# Patient Record
Sex: MALE | Race: ASIAN | NOT HISPANIC OR LATINO | URBAN - METROPOLITAN AREA
[De-identification: names, ages, dates, MRNs, and addresses within clinical notes are randomized per-mention and may not be internally consistent; named-entity substitution may affect disease eponyms.]

---

## 2024-04-03 ENCOUNTER — EMERGENCY (EMERGENCY)
Facility: HOSPITAL | Age: 49
LOS: 1 days | Discharge: HOME | End: 2024-04-03
Attending: EMERGENCY MEDICINE | Admitting: EMERGENCY MEDICINE
Payer: SELF-PAY

## 2024-04-03 VITALS
DIASTOLIC BLOOD PRESSURE: 87 MMHG | RESPIRATION RATE: 18 BRPM | OXYGEN SATURATION: 97 % | HEART RATE: 80 BPM | TEMPERATURE: 98 F | SYSTOLIC BLOOD PRESSURE: 135 MMHG

## 2024-04-03 LAB
A1C WITH ESTIMATED AVERAGE GLUCOSE RESULT: 11.4 % — HIGH (ref 4–5.6)
ALBUMIN SERPL ELPH-MCNC: 4.2 G/DL — SIGNIFICANT CHANGE UP (ref 3.3–5)
ALP SERPL-CCNC: 124 U/L — HIGH (ref 40–120)
ALT FLD-CCNC: 13 U/L — SIGNIFICANT CHANGE UP (ref 4–41)
ANION GAP SERPL CALC-SCNC: 11 MMOL/L — SIGNIFICANT CHANGE UP (ref 7–14)
AST SERPL-CCNC: 15 U/L — SIGNIFICANT CHANGE UP (ref 4–40)
B-OH-BUTYR SERPL-SCNC: <0 MMOL/L — SIGNIFICANT CHANGE UP (ref 0–0.4)
BASOPHILS # BLD AUTO: 0.02 K/UL — SIGNIFICANT CHANGE UP (ref 0–0.2)
BASOPHILS NFR BLD AUTO: 0.2 % — SIGNIFICANT CHANGE UP (ref 0–2)
BILIRUB SERPL-MCNC: 0.4 MG/DL — SIGNIFICANT CHANGE UP (ref 0.2–1.2)
BLOOD GAS VENOUS COMPREHENSIVE RESULT: SIGNIFICANT CHANGE UP
BUN SERPL-MCNC: 17 MG/DL — SIGNIFICANT CHANGE UP (ref 7–23)
CALCIUM SERPL-MCNC: 9.4 MG/DL — SIGNIFICANT CHANGE UP (ref 8.4–10.5)
CHLORIDE SERPL-SCNC: 100 MMOL/L — SIGNIFICANT CHANGE UP (ref 98–107)
CO2 SERPL-SCNC: 25 MMOL/L — SIGNIFICANT CHANGE UP (ref 22–31)
CREAT SERPL-MCNC: 0.75 MG/DL — SIGNIFICANT CHANGE UP (ref 0.5–1.3)
EGFR: 113 ML/MIN/1.73M2 — SIGNIFICANT CHANGE UP
EOSINOPHIL # BLD AUTO: 0.1 K/UL — SIGNIFICANT CHANGE UP (ref 0–0.5)
EOSINOPHIL NFR BLD AUTO: 1.1 % — SIGNIFICANT CHANGE UP (ref 0–6)
ESTIMATED AVERAGE GLUCOSE: 280 — SIGNIFICANT CHANGE UP
GLUCOSE SERPL-MCNC: 289 MG/DL — HIGH (ref 70–99)
HCT VFR BLD CALC: 46.2 % — SIGNIFICANT CHANGE UP (ref 39–50)
HGB BLD-MCNC: 16.3 G/DL — SIGNIFICANT CHANGE UP (ref 13–17)
IANC: 5.72 K/UL — SIGNIFICANT CHANGE UP (ref 1.8–7.4)
IMM GRANULOCYTES NFR BLD AUTO: 0.3 % — SIGNIFICANT CHANGE UP (ref 0–0.9)
LYMPHOCYTES # BLD AUTO: 2.94 K/UL — SIGNIFICANT CHANGE UP (ref 1–3.3)
LYMPHOCYTES # BLD AUTO: 31.9 % — SIGNIFICANT CHANGE UP (ref 13–44)
MCHC RBC-ENTMCNC: 28.2 PG — SIGNIFICANT CHANGE UP (ref 27–34)
MCHC RBC-ENTMCNC: 35.3 GM/DL — SIGNIFICANT CHANGE UP (ref 32–36)
MCV RBC AUTO: 79.8 FL — LOW (ref 80–100)
MONOCYTES # BLD AUTO: 0.42 K/UL — SIGNIFICANT CHANGE UP (ref 0–0.9)
MONOCYTES NFR BLD AUTO: 4.6 % — SIGNIFICANT CHANGE UP (ref 2–14)
NEUTROPHILS # BLD AUTO: 5.72 K/UL — SIGNIFICANT CHANGE UP (ref 1.8–7.4)
NEUTROPHILS NFR BLD AUTO: 61.9 % — SIGNIFICANT CHANGE UP (ref 43–77)
NRBC # BLD: 0 /100 WBCS — SIGNIFICANT CHANGE UP (ref 0–0)
NRBC # FLD: 0 K/UL — SIGNIFICANT CHANGE UP (ref 0–0)
PLATELET # BLD AUTO: 223 K/UL — SIGNIFICANT CHANGE UP (ref 150–400)
POTASSIUM SERPL-MCNC: 4.8 MMOL/L — SIGNIFICANT CHANGE UP (ref 3.5–5.3)
POTASSIUM SERPL-SCNC: 4.8 MMOL/L — SIGNIFICANT CHANGE UP (ref 3.5–5.3)
PROT SERPL-MCNC: 7.2 G/DL — SIGNIFICANT CHANGE UP (ref 6–8.3)
RBC # BLD: 5.79 M/UL — SIGNIFICANT CHANGE UP (ref 4.2–5.8)
RBC # FLD: 12.2 % — SIGNIFICANT CHANGE UP (ref 10.3–14.5)
SODIUM SERPL-SCNC: 136 MMOL/L — SIGNIFICANT CHANGE UP (ref 135–145)
WBC # BLD: 9.23 K/UL — SIGNIFICANT CHANGE UP (ref 3.8–10.5)
WBC # FLD AUTO: 9.23 K/UL — SIGNIFICANT CHANGE UP (ref 3.8–10.5)

## 2024-04-03 PROCEDURE — 99223 1ST HOSP IP/OBS HIGH 75: CPT

## 2024-04-03 PROCEDURE — 99222 1ST HOSP IP/OBS MODERATE 55: CPT

## 2024-04-03 PROCEDURE — 93010 ELECTROCARDIOGRAM REPORT: CPT

## 2024-04-03 RX ORDER — INSULIN GLARGINE 100 [IU]/ML
15 INJECTION, SOLUTION SUBCUTANEOUS AT BEDTIME
Refills: 0 | Status: DISCONTINUED | OUTPATIENT
Start: 2024-04-03 | End: 2024-04-06

## 2024-04-03 RX ORDER — DEXTROSE 10 % IN WATER 10 %
125 INTRAVENOUS SOLUTION INTRAVENOUS ONCE
Refills: 0 | Status: DISCONTINUED | OUTPATIENT
Start: 2024-04-03 | End: 2024-04-06

## 2024-04-03 RX ORDER — SODIUM CHLORIDE 9 MG/ML
1000 INJECTION, SOLUTION INTRAVENOUS
Refills: 0 | Status: DISCONTINUED | OUTPATIENT
Start: 2024-04-03 | End: 2024-04-06

## 2024-04-03 RX ORDER — INSULIN LISPRO 100/ML
VIAL (ML) SUBCUTANEOUS
Refills: 0 | Status: DISCONTINUED | OUTPATIENT
Start: 2024-04-03 | End: 2024-04-06

## 2024-04-03 RX ORDER — DEXTROSE 50 % IN WATER 50 %
15 SYRINGE (ML) INTRAVENOUS ONCE
Refills: 0 | Status: DISCONTINUED | OUTPATIENT
Start: 2024-04-03 | End: 2024-04-06

## 2024-04-03 RX ORDER — GLUCAGON INJECTION, SOLUTION 0.5 MG/.1ML
1 INJECTION, SOLUTION SUBCUTANEOUS ONCE
Refills: 0 | Status: DISCONTINUED | OUTPATIENT
Start: 2024-04-03 | End: 2024-04-06

## 2024-04-03 RX ORDER — DEXTROSE 50 % IN WATER 50 %
12.5 SYRINGE (ML) INTRAVENOUS ONCE
Refills: 0 | Status: DISCONTINUED | OUTPATIENT
Start: 2024-04-03 | End: 2024-04-06

## 2024-04-03 RX ORDER — SODIUM CHLORIDE 9 MG/ML
1000 INJECTION, SOLUTION INTRAVENOUS ONCE
Refills: 0 | Status: COMPLETED | OUTPATIENT
Start: 2024-04-03 | End: 2024-04-03

## 2024-04-03 RX ORDER — ACETAMINOPHEN 500 MG
650 TABLET ORAL ONCE
Refills: 0 | Status: COMPLETED | OUTPATIENT
Start: 2024-04-03 | End: 2024-04-03

## 2024-04-03 RX ORDER — INSULIN LISPRO 100/ML
VIAL (ML) SUBCUTANEOUS
Refills: 0 | Status: DISCONTINUED | OUTPATIENT
Start: 2024-04-03 | End: 2024-04-04

## 2024-04-03 RX ORDER — INSULIN LISPRO 100/ML
5 VIAL (ML) SUBCUTANEOUS
Refills: 0 | Status: DISCONTINUED | OUTPATIENT
Start: 2024-04-03 | End: 2024-04-06

## 2024-04-03 RX ORDER — DEXTROSE 50 % IN WATER 50 %
25 SYRINGE (ML) INTRAVENOUS ONCE
Refills: 0 | Status: DISCONTINUED | OUTPATIENT
Start: 2024-04-03 | End: 2024-04-06

## 2024-04-03 RX ADMIN — INSULIN GLARGINE 15 UNIT(S): 100 INJECTION, SOLUTION SUBCUTANEOUS at 22:15

## 2024-04-03 RX ADMIN — SODIUM CHLORIDE 1000 MILLILITER(S): 9 INJECTION, SOLUTION INTRAVENOUS at 12:46

## 2024-04-03 RX ADMIN — Medication 650 MILLIGRAM(S): at 12:46

## 2024-04-03 NOTE — ED CDU PROVIDER INITIAL DAY NOTE - CLINICAL SUMMARY MEDICAL DECISION MAKING FREE TEXT BOX
48M c/o L eye floaters x months, but since yesterday his vision is acutely decreased, he feels like there's a cloud in his vision.  Pt reports intermittent HA also.  Pt visiting here from [facility], plans to go back in 2 weeks.  No headache at present, no eye pain, no pain with EOM.  No vomiting or LOC.  No fever or eye discharge.  Denies trauma to eye.  PMHX Dm on pills.  PSHX none.  (+)T.  NKDA.  On exam Vs wnl.  .  L eye Vac 20/50.  R eye 20/25 both uncorrected (does not wear glasses).  Eye no conjunctival redness or chemosis.  EOMS intact.  Cornea clear.  No purulent discharge.  Fluorescein exam L eye no corneal uptake, neg yelitza's sign.  Notably hyperglycemic.  D/w optho - possibly vitreous hemorrhage or vitreous detachment vs hyperglycemia related lens swelling.  Will rx fluids, check labs r/o DKA, reass.  If plan for d/c will d/w dior Rodríguez on teams to arrange urgent outpt follow up; and he's aware that pt does not have insurance, accomodation can be made in resident clinic.    Place in CDU for endo, ophtho eval.  Recheck labs in AM.  Pt agreeable.

## 2024-04-03 NOTE — ED ADULT NURSE NOTE - OBJECTIVE STATEMENT
INTAKE RN: Patient arrives to intake. AOx4, ambulatory. Hx of DMT2. c/o intermittent floaters in left eye with intermittent headaches x 3 months. Denies eye pain or drainage. States he feel like his vision has decreased x 2 days, reports curtain-like sensation. Pt visiting here from [facility], states plans to go back in 2 weeks. Pt denies HA at this time. Denies dizziness, chest pain, palpitations, SOB, HA, n/v/d, constipation, fever, chills, cough,  numbness, tingling, dysuria. Breathing even and unlabored on room air, no signs of dyspnea or respiratory distress. No signs of cyanosis/pallor noted. No injuries, deformities or signs of trauma noted, no lacs/bleeding/hemtomas, ecchymosis or swelling noted. 20G IV placed in left ac. Labs drawn and sent. Vital signs as charted. Medication given and tolerated well per EMAR. Safety maintained, stretcher locked in lowest position with siderails up x2. Patient pending optho consult.

## 2024-04-03 NOTE — ED ADULT NURSE REASSESSMENT NOTE - NS ED NURSE REASSESS COMMENT FT1
Received patient from previous RN, A&O X4 , documentation as noted. Patient denies any pain or medical complaints at this time . Patient able to speak in clear sentences, respirations equal and unlabored.Patient in no acute distress at this time, will continue to monitor. VSS as noted in flowsheet.

## 2024-04-03 NOTE — ED ADULT NURSE NOTE - NSFALLUNIVINTERV_ED_ALL_ED
Bed/Stretcher in lowest position, wheels locked, appropriate side rails in place/Call bell, personal items and telephone in reach/Instruct patient to call for assistance before getting out of bed/chair/stretcher/Non-slip footwear applied when patient is off stretcher/Barker to call system/Physically safe environment - no spills, clutter or unnecessary equipment/Purposeful proactive rounding/Room/bathroom lighting operational, light cord in reach

## 2024-04-03 NOTE — ED PROVIDER NOTE - PROGRESS NOTE DETAILS
DD ED ATTG: pt feeling OK.  Advised of A1c 11.7, pt agreeable to stay overnight for endo.  Will inform ophtho for them to see pt also.

## 2024-04-03 NOTE — ED ADULT NURSE REASSESSMENT NOTE - NS ED NURSE REASSESS COMMENT FT1
intake RN: Patient remains at baseline mental status. Appears to be resting comfortably in stretcher, breathing even and unlabored on room air. Vital signs as charted. NAD noted at this time. Pt offers no new complaints at this time. Repeat fingerstick completed. Pt to be placed in CDU for observation, report given to CDU RN Brenna. Safety maintained, stretcher locked in lowest position with siderails up x2, call bell and personal items within reach.

## 2024-04-03 NOTE — ED PROVIDER NOTE - OBJECTIVE STATEMENT
48M c/o L eye floaters x months, but since yesterday his vision is acutely decreased, he feels like there's a cloud in his vision.  Pt reports intermittent HA also.  Pt visiting here from [facility], plans to go back in 2 weeks.  No headache at present, no eye pain, no pain with EOM.  No vomiting or LOC.  No fever or eye discharge.  Denies trauma to eye.  PMHX Dm on pills.  PSHX none.  (+)T.  NKDA.  On exam Vs wnl.  .  L eye Vac 20/50.  R eye 20/25 both uncorrected (does not wear glasses).  Eye no conjunctival redness or chemosis.  EOMS intact.  Cornea clear.  No purulent discharge.  Fluorescein exam L eye no corneal uptake, neg yelitza's sign.  Notably hyperglycemic.  D/w optho - possibly vitreous hemorrhage or vitreous detachment vs hyperglycemia related lens swelling.  Will rx fluids, check labs r/o DKA, reass.  If plan for d/c will d/w dior Rodríguez on teams to arrange urgent outpt follow up; and he's aware that pt does not have insurance, accomodation can be made in resident clinic.    VS:  unremarkable    GEN - NAD;   well appearing;   A+O x3   HEAD - NC/AT     ENT - PEERL, EOMI, mucous membranes    moist , no discharge   L periorbital no swelling or tenderness.  Face symmetric.  L eye Vac 20/50.  R eye 20/25 both uncorrected (does not wear glasses).  Eye no conjunctival redness or chemosis.  EOMS intact.  Cornea clear.  No purulent discharge.  Fluorescein exam L eye no corneal uptake, neg yelitza's sign.  NECK: Neck supple, non-tender without lymphadenopathy, no masses, no JVD  PULM - CTA b/l,  symmetric breath sounds  COR -  normal heart sounds    ABD - , ND, NT, soft,  BACK - no CVA tenderness, nontender spine     EXTREMS - no edema, no deformity, warm and well perfused    SKIN - no rash    or bruising      NEUROLOGIC - alert, face symmetric, speech fluent, sensation nl, motor no focal deficit.

## 2024-04-03 NOTE — ED CDU PROVIDER INITIAL DAY NOTE - OBJECTIVE STATEMENT
48M c/o L eye floaters x months, but since yesterday his vision is acutely decreased, he feels like there's a cloud in his vision.  Pt reports intermittent HA also.  Pt visiting here from [facility], plans to go back in 2 weeks.  No headache at present, no eye pain, no pain with EOM.  No vomiting or LOC.  No fever or eye discharge.  Denies trauma to eye.  PMHX Dm on pills.  PSHX none.  (+)T.  NKDA.  On exam Vs wnl.  .  L eye Vac 20/50.  R eye 20/25 both uncorrected (does not wear glasses).  Eye no conjunctival redness or chemosis.  EOMS intact.  Cornea clear.  No purulent discharge.  Fluorescein exam L eye no corneal uptake, neg yelitza's sign.  Notably hyperglycemic.  D/w optho - possibly vitreous hemorrhage or vitreous detachment vs hyperglycemia related lens swelling.  Will rx fluids, check labs r/o DKA, reass.  If plan for d/c will d/w dior Rodríguez on teams to arrange urgent outpt follow up; and he's aware that pt does not have insurance, accomodation can be made in resident clinic.    VS:  unremarkable    GEN - NAD;   well appearing;   A+O x3   HEAD - NC/AT     ENT - PEERL, EOMI, mucous membranes    moist , no discharge   L periorbital no swelling or tenderness.  Face symmetric.  L eye Vac 20/50.  R eye 20/25 both uncorrected (does not wear glasses).  Eye no conjunctival redness or chemosis.  EOMS intact.  Cornea clear.  No purulent discharge.  Fluorescein exam L eye no corneal uptake, neg yelitza's sign.  NECK: Neck supple, non-tender without lymphadenopathy, no masses, no JVD  PULM - CTA b/l,  symmetric breath sounds  COR -  normal heart sounds    ABD - , ND, NT, soft,  BACK - no CVA tenderness, nontender spine     EXTREMS - no edema, no deformity, warm and well perfused    SKIN - no rash    or bruising      NEUROLOGIC - alert, face symmetric, speech fluent, sensation nl, motor no focal deficit.

## 2024-04-03 NOTE — ED PROVIDER NOTE - CLINICAL SUMMARY MEDICAL DECISION MAKING FREE TEXT BOX
48M c/o L eye floaters x months, but since yesterday his vision is acutely decreased, he feels like there's a cloud in his vision.  Pt reports intermittent HA also.  Pt visiting here from [facility], plans to go back in 2 weeks.  No headache at present, no eye pain, no pain with EOM.  No vomiting or LOC.  No fever or eye discharge.  Denies trauma to eye.  PMHX Dm on pills.  PSHX none.  (+)T.  NKDA.  On exam Vs wnl.  .  L eye Vac 20/50.  R eye 20/25 both uncorrected (does not wear glasses).  Eye no conjunctival redness or chemosis.  EOMS intact.  Cornea clear.  No purulent discharge.  Fluorescein exam L eye no corneal uptake, neg yelitza's sign.  Notably hyperglycemic.  D/w optho - possibly vitreous hemorrhage or vitreous detachment vs hyperglycemia related lens swelling.  Will rx fluids, check labs r/o DKA, reass.  If plan for d/c will d/w dior Rodríguez on teams to arrange urgent outpt follow up; and he's aware that pt does not have insurance, accomodation can be made in resident clinic.

## 2024-04-03 NOTE — ED PROVIDER NOTE - PHYSICAL EXAMINATION
VS:  unremarkable    GEN - NAD;   well appearing;   A+O x3   HEAD - NC/AT     ENT - PEERL, EOMI, mucous membranes    moist , no discharge   L periorbital no swelling or tenderness.  Face symmetric.  L eye Vac 20/50.  R eye 20/25 both uncorrected (does not wear glasses).  Eye no conjunctival redness or chemosis.  EOMS intact.  Cornea clear.  No purulent discharge.  Fluorescein exam L eye no corneal uptake, neg yelitza's sign.  NECK: Neck supple, non-tender without lymphadenopathy, no masses, no JVD  PULM - CTA b/l,  symmetric breath sounds  COR -  normal heart sounds    ABD - , ND, NT, soft,  BACK - no CVA tenderness, nontender spine     EXTREMS - no edema, no deformity, warm and well perfused    SKIN - no rash    or bruising      NEUROLOGIC - alert, face symmetric, speech fluent, sensation nl, motor no focal deficit.

## 2024-04-04 VITALS
HEART RATE: 75 BPM | RESPIRATION RATE: 16 BRPM | SYSTOLIC BLOOD PRESSURE: 110 MMHG | TEMPERATURE: 98 F | DIASTOLIC BLOOD PRESSURE: 77 MMHG | OXYGEN SATURATION: 99 %

## 2024-04-04 DIAGNOSIS — E78.49 OTHER HYPERLIPIDEMIA: ICD-10-CM

## 2024-04-04 DIAGNOSIS — E11.65 TYPE 2 DIABETES MELLITUS WITH HYPERGLYCEMIA: ICD-10-CM

## 2024-04-04 DIAGNOSIS — F17.200 NICOTINE DEPENDENCE, UNSPECIFIED, UNCOMPLICATED: ICD-10-CM

## 2024-04-04 DIAGNOSIS — E11.319 TYPE 2 DIABETES MELLITUS WITH UNSPECIFIED DIABETIC RETINOPATHY WITHOUT MACULAR EDEMA: ICD-10-CM

## 2024-04-04 PROCEDURE — 99239 HOSP IP/OBS DSCHRG MGMT >30: CPT

## 2024-04-04 PROCEDURE — 99204 OFFICE O/P NEW MOD 45 MIN: CPT

## 2024-04-04 RX ORDER — METFORMIN HYDROCHLORIDE 850 MG/1
1 TABLET ORAL
Qty: 60 | Refills: 0
Start: 2024-04-04 | End: 2024-05-03

## 2024-04-04 RX ADMIN — Medication 5 UNIT(S): at 07:39

## 2024-04-04 RX ADMIN — Medication 1: at 07:53

## 2024-04-04 NOTE — ED CDU PROVIDER DISPOSITION NOTE - PATIENT PORTAL LINK FT
You can access the FollowMyHealth Patient Portal offered by [facility] by registering at the following website: [email or web address]. By joining [employer]’s FollowMyHealth portal, you will also be able to view your health information using other applications (apps) compatible with our system.

## 2024-04-04 NOTE — CONSULT NOTE ADULT - ASSESSMENT
48M type 2 DM uncontrolled HbA1c 11.4% visiting from [facility] with L vision loss. Patient is uninsured and returning to [facility] in 2 weeks.    #Poorly controlled T2DM with hyperglycemia  - HbA1c: 11.4%  - Home Regimen: metformin 500mg once daily, previously used Daonil in [facility] (glibenclamide)   - Endocrinologist: none, also no consistent PCP  -Lives in [facility] will return in 2 weeks, no insurance    While in hospital:  - Continue Lantus 15 units at bedtime. DO NOT HOLD IF NPO.  - Continue Admelog 5 units TID pre-meal. HOLD IF NPO.  - Use low dose Admelog correction scale pre-meal  -Use low dose Admelog correction scale at bedtime  - Fingerstick BG before meals and bedtime  - Goal -180  - Carbohydrate consistent diet    Discharge plan:  - Discharge medications: increase metformin to 1000mg BID, take with food. Prescribe glipizide XL 2.5mg daily.  Once back in [facility] can obtain sulfonylurea available there.  Reviewed to monitor for signs/symptoms of hypoglycemia  -Reviewed importance of improving diet, stop sweetened beverages, reviewed plate method for carb management   - Ensure patient has glucometer, test strips and lancets on discharge.  -Reviewed importance of obtaining a doctor for DM care in [facility] with routine follow up.    #Diabetic retinopathy  Seen by ophtho noted with severe nonproliferative diabetic retinopathy  will need outpatient follow up for treatment   glycemic control as above  BP controlled    #HLD  - outpatient lipid profile  - Would likely benefit from a statin if no contraindication    #Tobacco use  Recommend smoking cessation    Discussed with CDU team.    Jazmyn Mcintosh MD  Division of Endocrinology  Pager: [phone]    If after 6PM or before 9AM, or on weekends/holidays, please call endocrine answering service for assistance ([phone]).  For nonurgent matters email [email or web address] for assistance.

## 2024-04-04 NOTE — ED CDU PROVIDER SUBSEQUENT DAY NOTE - HISTORY
48-year-old male with past medical history DM2 (previously on metformin) presented to the ER with bilateral blurry vision worse on the left compared to right.  Patient reports blurry vision for months with worsening yesterday with associated intermittent headache.  Patient elevated blood glucose and A1c 11.4, labs not with DKA.  Patient sent to CDU for endocrine consult as well awaiting ophthalmology consult.

## 2024-04-04 NOTE — ED CDU PROVIDER DISPOSITION NOTE - NSPTACCESSSVCSAPPTDETAILS_ED_ALL_ED_FT
endocrine - diabetes, worsening A1c, needs f/u   opthalmology (RETINA) specialist - blurred vision, diabetic retinopathy

## 2024-04-04 NOTE — CONSULT NOTE ADULT - SUBJECTIVE AND OBJECTIVE BOX
HPI: 48M Type 2 DM from [facility] presents for L eye vision loss/ floaters x few months.  Visiting from [facility] plans to return in 2 weeks.  HbA1c 11.4%  Home DM regimen - metformin 500mg once daily (sometimes increases to twice)  Has been taking this last few months.  Previously taking "Daonil" 5mg, he stopped because it was not working.  Sometimes had hypoglycemia on Daonil.  Check glucose about twice per week usually over 200 (range 130-270).  Eats "regular food" which includes sweetened beverages, rice, bread etc.  Denies numbness, tingling in feet, denies history of kidney disease, heart disease, CVA.  Denies family history of DM  + tobacco 1ppd or less  denies etoh  Patient is uninsured    PAST MEDICAL & SURGICAL HISTORY:  DM (diabetes mellitus)      No significant past surgical history      FAMILY HISTORY: see hpi      Social History: see hpi    alcohol swab: use before meals and at bedtime to check your sugars  GlipiZIDE XL 2.5 mg oral tablet, extended release: 1 tab(s) orally 2 times a day (with meals)  lancets: use before meals and at bedtime to check your sugars  metFORMIN 1000 mg oral tablet: 1 tab(s) orally 2 times a day (with meals)  test strips: use before meals nad at bedtime to check your sugars      MEDICATIONS  (STANDING):  dextrose 10% Bolus 125 milliLiter(s) IV Bolus once  dextrose 5%. 1000 milliLiter(s) (100 mL/Hr) IV Continuous <Continuous>  dextrose 5%. 1000 milliLiter(s) (50 mL/Hr) IV Continuous <Continuous>  dextrose 50% Injectable 25 Gram(s) IV Push once  dextrose 50% Injectable 12.5 Gram(s) IV Push once  glucagon  Injectable 1 milliGRAM(s) IntraMuscular once  insulin glargine Injectable (LANTUS) 15 Unit(s) SubCutaneous at bedtime  insulin lispro (ADMELOG) corrective regimen sliding scale   SubCutaneous three times a day before meals  insulin lispro Injectable (ADMELOG) 5 Unit(s) SubCutaneous three times a day before meals    MEDICATIONS  (PRN):  dextrose Oral Gel 15 Gram(s) Oral once PRN Blood Glucose LESS THAN 70 milliGRAM(s)/deciliter      Allergies    No Known Allergies    Intolerances      Review of Systems:  Constitutional: No fever  Eyes: L eye blurry vision/floaters  Neuro: No tremors  HEENT: No pain  Cardiovascular: No chest pain, palpitations  Respiratory: No SOB, no cough  GI: No nausea, vomiting, abdominal pain  : No dysuria  Skin: no rash  Psych: no depression  Endocrine: no polyuria, polydipsia  Hem/lymph: no swelling  Osteoporosis: no fractures    ALL OTHER SYSTEMS REVIEWED AND NEGATIVE        PHYSICAL EXAM:  VITALS: T(C): 36.6 (04-04-24 @ 10:06)  T(F): 97.9 (04-04-24 @ 10:06), Max: 98.1 (04-03-24 @ 17:00)  HR: 75 (04-04-24 @ 10:06) (65 - 76)  BP: 110/77 (04-04-24 @ 10:06) (110/77 - 130/89)  RR:  (16 - 17)  SpO2:  (97% - 100%)  Wt(kg): --  GENERAL: NAD, well-groomed, well-developed  EYES: No proptosis, no lid lag, anicteric  HEENT:  Atraumatic, Normocephalic, moist mucous membranes  THYROID: Normal size, no palpable nodules  RESPIRATORY: Clear to auscultation bilaterally; No rales, rhonchi, wheezing  CARDIOVASCULAR: Regular rate and rhythm; No murmurs; no peripheral edema  GI: Soft, nontender, non distended, normal bowel sounds  SKIN: Dry, intact, No rashes or lesions  MUSCULOSKELETAL: Full range of motion, normal strength  NEURO: sensation intact, extraocular movements intact, no tremor  PSYCH: Alert and oriented x 3, normal affect, normal mood  CUSHING'S SIGNS: no striae      CAPILLARY BLOOD GLUCOSE      POCT Blood Glucose.: 206 mg/dL (04 Apr 2024 11:36)  POCT Blood Glucose.: 165 mg/dL (04 Apr 2024 07:28)  POCT Blood Glucose.: 187 mg/dL (03 Apr 2024 21:22)  POCT Blood Glucose.: 189 mg/dL (03 Apr 2024 16:45)                            16.3   9.23  )-----------( 223      ( 03 Apr 2024 12:35 )             46.2       04-03    136  |  100  |  17  ----------------------------<  289<H>  4.8   |  25  |  0.75    eGFR: 113    Ca    9.4      04-03    TPro  7.2  /  Alb  4.2  /  TBili  0.4  /  DBili  x   /  AST  15  /  ALT  13  /  AlkPhos  124<H>  04-03      Thyroid Function Tests:      A1C with Estimated Average Glucose Result: 11.4 % (04-03-24 @ 12:35)          Radiology:

## 2024-04-04 NOTE — ED CDU PROVIDER SUBSEQUENT DAY NOTE - PROGRESS NOTE DETAILS
Patient resting comfortably, no complaints.  States that his chronic blurry vision is at baseline.  Eating breakfast.  Pending Endo recommendations, ophthalmology follow-up as outpatient.,  Likely discharge.

## 2024-04-04 NOTE — CONSULT NOTE ADULT - ATTENDING COMMENTS
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 Manage BP/BS. F/u Retina OP.

## 2024-04-04 NOTE — ED CDU PROVIDER DISPOSITION NOTE - NSFOLLOWUPINSTRUCTIONS_ED_ALL_ED_FT
You were seen in our department for Hyperglycemia; blurred vision.  Start taking metformin 1000mg twice daily.  Start taking Glipizide 2.5mg XL twice daily with meals.  check your sugars 4 times daily, before meals and at bedtime.  Follow up with your PMD in 48-72 hours for further monitoring.  Follow up with Endocrinology clinic as well as opthalmology (retina) specialist within 1 week for further evaluation.  if you develop any chest pain, dizziness, high fevers, weakness, numbness, tingling, vision changes, or any worsening symptoms return to our ED for evaluation.

## 2024-04-04 NOTE — CONSULT NOTE ADULT - SUBJECTIVE AND OBJECTIVE BOX
[facility] DEPARTMENT OF OPHTHALMOLOGY - INITIAL ADULT CONSULT  -----------------------------------------------------------------------------------------------------------------  Jewel Zacarias MD PGY 3  Contact via [employer] Teams  -----------------------------------------------------------------------------------------------------------------    HPI: per ED  48M c/o L eye floaters x months, but since yesterday his vision is acutely decreased, he feels like there's a cloud in his vision.  Pt reports intermittent HA also.  Pt visiting here from [facility], plans to go back in 2 weeks.  No headache at present, no eye pain, no pain with EOM.  No vomiting or LOC.  No fever or eye discharge.  Denies trauma to eye.      Interval History: Decreased vision for a few months. States he feels like he is looking through a cloud. Denies flashes/floaters.     PAST MEDICAL & SURGICAL HISTORY:  DM (diabetes mellitus)      No significant past surgical history        Past Ocular History: None per pt          MEDICATIONS  (STANDING):  dextrose 10% Bolus 125 milliLiter(s) IV Bolus once  dextrose 5%. 1000 milliLiter(s) (100 mL/Hr) IV Continuous <Continuous>  dextrose 5%. 1000 milliLiter(s) (50 mL/Hr) IV Continuous <Continuous>  dextrose 50% Injectable 25 Gram(s) IV Push once  dextrose 50% Injectable 12.5 Gram(s) IV Push once  glucagon  Injectable 1 milliGRAM(s) IntraMuscular once  insulin glargine Injectable (LANTUS) 15 Unit(s) SubCutaneous at bedtime  insulin lispro (ADMELOG) corrective regimen sliding scale   SubCutaneous three times a day before meals  insulin lispro Injectable (ADMELOG) 5 Unit(s) SubCutaneous three times a day before meals    MEDICATIONS  (PRN):  dextrose Oral Gel 15 Gram(s) Oral once PRN Blood Glucose LESS THAN 70 milliGRAM(s)/deciliter    Allergies & Intolerances:   No Known Allergies    Review of Systems:  Constitutional: No fever, chills  Eyes: No blurry vision, flashes, floaters, FBS, erythema, discharge, double vision, OU  Neuro: No tremors  Cardiovascular: No chest pain, palpitations  Respiratory: No SOB, no cough  GI: No nausea, vomiting, abdominal pain  : No dysuria  Skin: no rash  Psych: no depression  Endocrine: no polyuria, polydipsia  Heme/lymph: no swelling    VITALS: T(C): 36.6 (04-04-24 @ 05:51)  T(F): 97.8 (04-04-24 @ 05:51), Max: 98.1 (04-03-24 @ 17:00)  HR: 75 (04-04-24 @ 05:51) (65 - 80)  BP: 121/86 (04-04-24 @ 05:51) (111/79 - 135/87)  RR:  (16 - 18)  SpO2:  (97% - 100%)  Wt(kg): --  General: AAO x 3, appropriate mood and affect    Ophthalmology Exam:  Visual acuity (sc): 20/20 OU.  Pupils: PERRL OU, no APD  Tonometry:    Extraocular movements (EOMs): Full OU, no pain, no diplopia.  Confrontational Visual Field (CVF): Full OU.  Color Plates: 12/12 OU.    Pen Light Exam (PLE)  External: Normal OU.  Lids/Lashes/Lacrimal Ducts: Flat OU.  Sclera/Conjunctiva: White and quiet OU.  Cornea: Clear OU.  Anterior Chamber: Deep and formed OU.    Iris: Flat OU.  Lens: Clear OU.    Fundus Exam: dilated with 1% tropicamide and 2.5% phenylephrine  Approval obtained from primary team for dilation  Permission to dilate obtained from   Patient aware that pupils can remained dilated for at least 4-6 hours.  Exam performed with 20 D lens    Vitreous: wnl OU  Disc, cup/disc: sharp and pink, 0.4 OU  Macula: wnl OU  Vessels: wnl OU  Periphery: wnl OU       [facility] DEPARTMENT OF OPHTHALMOLOGY - INITIAL ADULT CONSULT  -----------------------------------------------------------------------------------------------------------------  Jewel Zacarias MD PGY 3  Contact via [employer] Teams  -----------------------------------------------------------------------------------------------------------------    HPI: per ED  48M c/o L eye floaters x months, but since yesterday his vision is acutely decreased, he feels like there's a cloud in his vision.  Pt reports intermittent HA also.  Pt visiting here from [facility], plans to go back in 2 weeks.  No headache at present, no eye pain, no pain with EOM.  No vomiting or LOC.  No fever or eye discharge.  Denies trauma to eye.      Interval History: Decreased vision for a few months. States he feels like he is looking through a cloud. Denies flashes/floaters.     PAST MEDICAL & SURGICAL HISTORY:  DM (diabetes mellitus)      No significant past surgical history        Past Ocular History: None per pt          MEDICATIONS  (STANDING):  dextrose 10% Bolus 125 milliLiter(s) IV Bolus once  dextrose 5%. 1000 milliLiter(s) (100 mL/Hr) IV Continuous <Continuous>  dextrose 5%. 1000 milliLiter(s) (50 mL/Hr) IV Continuous <Continuous>  dextrose 50% Injectable 25 Gram(s) IV Push once  dextrose 50% Injectable 12.5 Gram(s) IV Push once  glucagon  Injectable 1 milliGRAM(s) IntraMuscular once  insulin glargine Injectable (LANTUS) 15 Unit(s) SubCutaneous at bedtime  insulin lispro (ADMELOG) corrective regimen sliding scale   SubCutaneous three times a day before meals  insulin lispro Injectable (ADMELOG) 5 Unit(s) SubCutaneous three times a day before meals    MEDICATIONS  (PRN):  dextrose Oral Gel 15 Gram(s) Oral once PRN Blood Glucose LESS THAN 70 milliGRAM(s)/deciliter    Allergies & Intolerances:   No Known Allergies    Review of Systems:  Constitutional: No fever, chills  Eyes: No blurry vision, flashes, floaters, FBS, erythema, discharge, double vision, OU  Neuro: No tremors  Cardiovascular: No chest pain, palpitations  Respiratory: No SOB, no cough  GI: No nausea, vomiting, abdominal pain  : No dysuria  Skin: no rash  Psych: no depression  Endocrine: no polyuria, polydipsia  Heme/lymph: no swelling    VITALS: T(C): 36.6 (04-04-24 @ 05:51)  T(F): 97.8 (04-04-24 @ 05:51), Max: 98.1 (04-03-24 @ 17:00)  HR: 75 (04-04-24 @ 05:51) (65 - 80)  BP: 121/86 (04-04-24 @ 05:51) (111/79 - 135/87)  RR:  (16 - 18)  SpO2:  (97% - 100%)  Wt(kg): --  General: AAO x 3, appropriate mood and affect    Ophthalmology Exam:  Visual acuity (sc): 20/20 OD, 20/40   Pupils: no rapd. OD RR. OS: PS, irregular. reactive  Tonometry:  12 OU  Extraocular movements (EOMs): Full OU, no pain, no diplopia.  Confrontational Visual Field (CVF): Full OU.  Color Plates: 12/12 OU.    Pen Light Exam (PLE)  External: Normal OU.  Lids/Lashes/Lacrimal Ducts: Flat OU.  Sclera/Conjunctiva: White and quiet OU.  Cornea: Clear OU.  Anterior Chamber: Deep and formed OU.    Iris: Flat OD. OS: Significant PS. Areas of fibrosis to the lens capsule. No NVI OU  Lens: 1+ NS OD. 1+ NS OS, significant capsular fibrosis anteriorly.     Fundus Exam: dilated with 1% tropicamide and 2.5% phenylephrine  Approval obtained from primary team for dilation  Permission to dilate obtained from   Patient aware that pupils can remained dilated for at least 4-6 hours.  Exam performed with 20 D lens    Vitreous: wnl OU  Disc, cup/disc: sharp and pink, 0.3 OU. No NVD OU  Macula: wnl OD. OS: fibrosis  Vessels: wnl OU  Periphery: OD: 4quadranst DBH + CWS. OS: tractional bands scattered

## 2024-04-04 NOTE — ED CDU PROVIDER SUBSEQUENT DAY NOTE - ATTENDING APP SHARED VISIT CONTRIBUTION OF CARE
CDU MD RITTER:  I performed a face to face bedside interview with patient regarding history of present illness, review of symptoms and past medical history. I completed an independent physical exam.  I have discussed patient's plan of care with PA.   I agree with note as stated above, having amended the EMR as needed to reflect my findings. I have discussed the assessment and plan of care.  This includes during the time I functioned as the attending physician for this patient.

## 2024-04-04 NOTE — CONSULT NOTE ADULT - ASSESSMENT
Assessment and Recommendations:  48y male with past medical history of diabetes. No known ocular history. Ophthalmology consulted for months of decreased vision in the left eye.      #Cataract OS  - Va 20/40. no rapd  - Pupil is irregular due to PS. Dilates poorly. Will need iris hooks.      #Diabetes  -     Outpatient Follow-up: Patient should follow-up with his/her ophthalmologist or with [facility] Department of Ophthalmology within 1 week of after discharge at:    [address]  [place] [address]  [phone]    Jewel Zacarias MD PGY 3  Available on [employer] Teams   Assessment and Recommendations:  48y male with past medical history of diabetes. No known ocular history. Ophthalmology consulted for months of decreased vision in the left eye.        #Diabetic Retinopathy OU, Severe NPDR OD, PDR OS  #Cataract OS  - Va 20/40. no rapd  - Pupil is irregular due to PS. Dilates poorly. Lots of capsular fibrosis  - DFE with severe NPDR OD, PDR OS. No NVI/NVD. Likely NVE, however poor view through nondilating pupil.   - Will need outpatient retina evaluation, likely for PPV/laser and PRP.     Juliet PARKER Dr